# Patient Record
Sex: MALE | URBAN - METROPOLITAN AREA
[De-identification: names, ages, dates, MRNs, and addresses within clinical notes are randomized per-mention and may not be internally consistent; named-entity substitution may affect disease eponyms.]

---

## 2023-02-13 ENCOUNTER — OFFICE VISIT (OUTPATIENT)
Dept: URBAN - METROPOLITAN AREA CLINIC 107 | Facility: CLINIC | Age: 53
End: 2023-02-13
Payer: COMMERCIAL

## 2023-02-13 ENCOUNTER — WEB ENCOUNTER (OUTPATIENT)
Dept: URBAN - METROPOLITAN AREA CLINIC 107 | Facility: CLINIC | Age: 53
End: 2023-02-13

## 2023-02-13 ENCOUNTER — DASHBOARD ENCOUNTERS (OUTPATIENT)
Age: 53
End: 2023-02-13

## 2023-02-13 VITALS
WEIGHT: 133 LBS | DIASTOLIC BLOOD PRESSURE: 72 MMHG | SYSTOLIC BLOOD PRESSURE: 135 MMHG | BODY MASS INDEX: 21.38 KG/M2 | HEIGHT: 66 IN | HEART RATE: 67 BPM | TEMPERATURE: 98.4 F

## 2023-02-13 DIAGNOSIS — Z87.11 HISTORY OF GASTRIC ULCER: ICD-10-CM

## 2023-02-13 DIAGNOSIS — Z72.0 TOBACCO ABUSE: ICD-10-CM

## 2023-02-13 DIAGNOSIS — Z12.11 COLON CANCER SCREENING: ICD-10-CM

## 2023-02-13 DIAGNOSIS — F10.10 ALCOHOL ABUSE: ICD-10-CM

## 2023-02-13 DIAGNOSIS — R19.5 POSITIVE FECAL IMMUNOCHEMICAL TEST: ICD-10-CM

## 2023-02-13 PROBLEM — 15167005: Status: ACTIVE | Noted: 2023-02-13

## 2023-02-13 PROCEDURE — 99203 OFFICE O/P NEW LOW 30 MIN: CPT | Performed by: NURSE PRACTITIONER

## 2023-02-13 NOTE — HPI-TODAY'S VISIT:
51 yo male referred by Dr. Helen Longo from [facility] lower GI bleeding. A copy of today's visit will be forwarded to the referring provider. According to the referral notes, he needs to be considered for colonoscopy secondary to a positive FIT test in March 2021. He has a history of alcohol and tobacco abuse. Recent labs are reviewed. CBC and CMP are within normal limits. According to direct access colonoscopy screening, he has a first degree relative with history of colon cancer, diagnosed at age 60. However, he states that this may not be true. His family history is largely unknown. He moved to [place] about 27 years ago from [place], [place]. He does not have much contact with his family.  He admits alcohol abuse. He drinks about 12 beers per day. In 2018, he had a ruptured ulcer in the stomach. He was transferred from [facility], to [facility]. He underwent surgery at [place] with Dr. Andrew Bailey.  He does not think he drinks too much. He was recently started on Synthroid, which was prescribed this morning by Dr. Delgado. He was recently seen by an opthalmologist, and was diagnosed with early onset glaucoma. He smokes cigarettes, estimating that he smokes at least a pack per day. He smokes some marijuana too. No other drugs. He does not use any NSAIDs.   There is no dysphagia. There is no heartburn or abdominal pain. No nausea or vomiting. There is no melena or red blood per rectum. He has bowel movements daily in the mornings. There has been no weight loss.

## 2024-10-18 ENCOUNTER — OFFICE VISIT (OUTPATIENT)
Dept: URBAN - METROPOLITAN AREA CLINIC 107 | Facility: CLINIC | Age: 54
End: 2024-10-18
Payer: COMMERCIAL

## 2024-10-18 ENCOUNTER — LAB OUTSIDE AN ENCOUNTER (OUTPATIENT)
Dept: URBAN - METROPOLITAN AREA CLINIC 107 | Facility: CLINIC | Age: 54
End: 2024-10-18

## 2024-10-18 VITALS
DIASTOLIC BLOOD PRESSURE: 76 MMHG | RESPIRATION RATE: 18 BRPM | HEIGHT: 66 IN | TEMPERATURE: 97.9 F | HEART RATE: 73 BPM | SYSTOLIC BLOOD PRESSURE: 160 MMHG | OXYGEN SATURATION: 98 % | BODY MASS INDEX: 22.5 KG/M2 | WEIGHT: 140 LBS

## 2024-10-18 DIAGNOSIS — K62.82 AIN GRADE I: ICD-10-CM

## 2024-10-18 DIAGNOSIS — D12.4 ADENOMATOUS POLYP OF DESCENDING COLON: ICD-10-CM

## 2024-10-18 DIAGNOSIS — A63.0 ANAL CONDYLOMA: ICD-10-CM

## 2024-10-18 DIAGNOSIS — F10.10 ALCOHOL ABUSE: ICD-10-CM

## 2024-10-18 PROBLEM — 240597001: Status: ACTIVE | Noted: 2024-10-18

## 2024-10-18 PROBLEM — 401313006: Status: ACTIVE | Noted: 2024-10-18

## 2024-10-18 PROCEDURE — 99214 OFFICE O/P EST MOD 30 MIN: CPT | Performed by: NURSE PRACTITIONER

## 2024-10-18 RX ORDER — CYCLOBENZAPRINE HYDROCHLORIDE 10 MG/1
TAKE 1 TABLET BY MOUTH EVERY 8 HOURS AS NEEDED FOR MUSCLE SPASM FOR 5 DAYS TABLET, FILM COATED ORAL
Qty: 15 EACH | Refills: 0 | Status: ACTIVE | COMMUNITY

## 2024-10-18 RX ORDER — TIMOLOL MALEATE 5 MG/ML
INSTILL 1 DROP INTO RIGHT EYE TWICE A DAY SOLUTION/ DROPS OPHTHALMIC
Qty: 5 MILLILITER | Refills: 0 | Status: ACTIVE | COMMUNITY

## 2024-10-18 RX ORDER — LATANOPROST 50 UG/ML
INSTILL 1 DROP INTO BOTH EYES EVERY EVENING SOLUTION/ DROPS OPHTHALMIC
Qty: 2.5 MILLILITER | Refills: 0 | Status: ACTIVE | COMMUNITY

## 2024-10-18 RX ORDER — LEVOTHYROXINE SODIUM 50 UG/1
TAKE 1 TABLET BY MOUTH EVERY DAY IN THE MORNING ON AN EMPTY STOMACH FOR 30 DAYS TABLET ORAL
Qty: 30 EACH | Refills: 2 | Status: ACTIVE | COMMUNITY

## 2024-10-18 RX ORDER — POLYETHYLENE GLYCOL 3350, SODIUM SULFATE ANHYDROUS, SODIUM BICARBONATE, SODIUM CHLORIDE, POTASSIUM CHLORIDE 236; 22.74; 6.74; 5.86; 2.97 G/4L; G/4L; G/4L; G/4L; G/4L
AS DIRECTED POWDER, FOR SOLUTION ORAL
Qty: 1 KIT | Refills: 0 | OUTPATIENT
Start: 2024-10-18

## 2024-10-18 NOTE — HPI-TODAY'S VISIT:
53-year-old male referred by Dr. Noguera to Dr. Fuentes for adenomatous colon polyp which was not fully resected on recent colonoscopy. AIN 1 noted on condyloma biopsy. Also referred for alcoholic liver disease. Does have a history of cirrhosis, nicotine abuse and hypothyroidism.  In 2017, he had a ruptured ulcer in the stomach. He was transferred from [facility], to [facility]. He underwent surgery at [facility] with Dr. Andrew Bailey.  Last seen 2/13/2023 for screening colonoscopy which was deferred due to alcohol abuse it was felt he would not be able to avoid alcohol long enough to prep for procedure.  Drinks 12 beers a day and smokes a pack of cigarettes daily. Alcohol and tobacco cessation encouraged.  History of ruptured gastric ulcer requiring exploratory surgery in 2017 by Dr. Bailey records from [facility] requested.  Colonoscopy 8/5/2024 by Dr. Noguera.  Perianal external exam performed with palpable masses could be consistent with condyloma.  OLIVE performed no palpable masses noted.  Prep was fair.  1 sessile polyp in the descending colon which was biopsied could not be removed despite multiple attempts.  Tattoo placed distal to this.  Biopsy was performed to perianal lesion suspected to be condyloma.  Descending colon polyp was tubular adenoma, perianal biopsy revealed low-grade squamous intraepithelial lesion AIN 1.  Labs 3/28/2024.  CBC normal with Hgb 15.2, platelet 341.  HIV nonreactive.  CMP: Normal.  HCV antibody-negative.  TSH elevated 13.9 with low free T4 at 0.73. Vitamin D low at 22.2.  He is here today with a family friend. He reports that he drinks [place] beer-6 bottles a day.  No hard liquor or or wine.  He stepped out to use the bathroom and the family friend with him reports he drinks 12-24 bottles of beer a day.  His sister was on the phone for the exam. He apparently ate solids the day of his last prep and didn't complete the full GoLYTELY prep.  There is no dysphagia. There is no heartburn or abdominal pain. No nausea or vomiting. There is no melena or red blood per rectum. He has bowel movements daily in the mornings. There has been no weight loss.

## 2024-10-29 ENCOUNTER — TELEPHONE ENCOUNTER (OUTPATIENT)
Dept: URBAN - METROPOLITAN AREA CLINIC 107 | Facility: CLINIC | Age: 54
End: 2024-10-29

## 2024-11-07 ENCOUNTER — OFFICE VISIT (OUTPATIENT)
Dept: URBAN - METROPOLITAN AREA MEDICAL CENTER 19 | Facility: MEDICAL CENTER | Age: 54
End: 2024-11-07
Payer: COMMERCIAL

## 2024-11-07 DIAGNOSIS — D12.2 ADENOMA OF ASCENDING COLON: ICD-10-CM

## 2024-11-07 DIAGNOSIS — D12.4 ADENOMA OF DESCENDING COLON: ICD-10-CM

## 2024-11-07 PROCEDURE — 45381 COLONOSCOPY SUBMUCOUS NJX: CPT | Performed by: INTERNAL MEDICINE

## 2024-11-07 PROCEDURE — 45385 COLONOSCOPY W/LESION REMOVAL: CPT | Performed by: INTERNAL MEDICINE

## 2024-11-07 RX ORDER — LEVOTHYROXINE SODIUM 50 UG/1
TAKE 1 TABLET BY MOUTH EVERY DAY IN THE MORNING ON AN EMPTY STOMACH FOR 30 DAYS TABLET ORAL
Qty: 30 EACH | Refills: 2 | Status: ACTIVE | COMMUNITY

## 2024-11-07 RX ORDER — CYCLOBENZAPRINE HYDROCHLORIDE 10 MG/1
TAKE 1 TABLET BY MOUTH EVERY 8 HOURS AS NEEDED FOR MUSCLE SPASM FOR 5 DAYS TABLET, FILM COATED ORAL
Qty: 15 EACH | Refills: 0 | Status: ACTIVE | COMMUNITY

## 2024-11-07 RX ORDER — POLYETHYLENE GLYCOL 3350, SODIUM SULFATE ANHYDROUS, SODIUM BICARBONATE, SODIUM CHLORIDE, POTASSIUM CHLORIDE 236; 22.74; 6.74; 5.86; 2.97 G/4L; G/4L; G/4L; G/4L; G/4L
AS DIRECTED POWDER, FOR SOLUTION ORAL
Qty: 1 KIT | Refills: 0 | Status: ACTIVE | COMMUNITY
Start: 2024-10-18

## 2024-11-07 RX ORDER — TIMOLOL MALEATE 5 MG/ML
INSTILL 1 DROP INTO RIGHT EYE TWICE A DAY SOLUTION/ DROPS OPHTHALMIC
Qty: 5 MILLILITER | Refills: 0 | Status: ACTIVE | COMMUNITY

## 2024-11-07 RX ORDER — LATANOPROST 50 UG/ML
INSTILL 1 DROP INTO BOTH EYES EVERY EVENING SOLUTION/ DROPS OPHTHALMIC
Qty: 2.5 MILLILITER | Refills: 0 | Status: ACTIVE | COMMUNITY

## 2024-11-10 ENCOUNTER — TELEPHONE ENCOUNTER (OUTPATIENT)
Dept: URBAN - METROPOLITAN AREA CLINIC 113 | Facility: CLINIC | Age: 54
End: 2024-11-10